# Patient Record
Sex: FEMALE | Race: WHITE | URBAN - METROPOLITAN AREA
[De-identification: names, ages, dates, MRNs, and addresses within clinical notes are randomized per-mention and may not be internally consistent; named-entity substitution may affect disease eponyms.]

---

## 2017-01-10 ENCOUNTER — OFFICE VISIT (OUTPATIENT)
Dept: OBGYN CLINIC | Age: 52
End: 2017-01-10

## 2017-01-10 NOTE — PROGRESS NOTES
A user error has taken place: encounter opened in error, closed for administrative reasons. Patient came in for colpo but was on her period and told to r/s.

## 2017-01-24 ENCOUNTER — OFFICE VISIT (OUTPATIENT)
Dept: OBGYN CLINIC | Age: 52
End: 2017-01-24

## 2017-01-24 ENCOUNTER — HOSPITAL ENCOUNTER (OUTPATIENT)
Dept: LAB | Age: 52
Discharge: HOME | End: 2017-01-24

## 2017-01-24 VITALS
SYSTOLIC BLOOD PRESSURE: 118 MMHG | HEIGHT: 65 IN | DIASTOLIC BLOOD PRESSURE: 70 MMHG | WEIGHT: 186 LBS | BODY MASS INDEX: 30.99 KG/M2

## 2017-01-24 DIAGNOSIS — R87.610 ASCUS WITH POSITIVE HIGH RISK HPV CERVICAL: Primary | ICD-10-CM

## 2017-01-24 DIAGNOSIS — R87.810 ASCUS WITH POSITIVE HIGH RISK HPV CERVICAL: Primary | ICD-10-CM

## 2017-01-24 NOTE — PROGRESS NOTES
[facility]  OFFICE PROCEDURE PROGRESS NOTE        Chart reviewed for the following:   Sydney CHAO LPN, have reviewed the History, Physical and updated the Allergic reactions for [address] performed immediately prior to start of procedure:   Lalo Vega LPN, have performed the following reviews on Tami Love prior to the start of the procedure:            * Patient was identified by name and date of birth   * Agreement on procedure being performed was verified  * Risks and Benefits explained to the patient  * Procedure site verified and marked as necessary  * Patient was positioned for comfort  * Consent was signed and verified     Time: 9:37 AM        Date of procedure: 2017    Procedure performed by:  Silas Carmichael MD    How tolerated by patient: tolerated the procedure well with no complications    Post Procedural Pain Scale: 2 - Hurts Little Bit    Comments: none      Procedure Note: Colposcopy    Tami Love is a ,  54 y.o. female [facility] whose Patient's last menstrual period was 2016 (approximate). was on 2016. She presents for colposcopy for evaluation of a cervical abnormality with a pap smear abnormality consisting of ASCUS, +HPV 16. After being presented with the risks, benefits and alternatives has she signed a consent for the procedure. She states that she understands the need for the procedure and has no further questions. She was informed that she may experience discomfort. Procedure:  She was positioned in the dorsal lithotomy position and a speculum was inserted into the vagina. Dilute acetic acid was applied to the cervix. The colposcope was used to visualize the cervix. Procedure: The transformation zone was completely visualized. Findings: This colposcopy was satisfactory. Biopsies were taken from the cervix . See accompanying diagram for biopsy sites. Monsels was applied to the cervix. Endocervical currettage: An endocervical curettage was performed. Findings:The procedure was notable for white epithelium on the cervix. Post Procedure Status: The patient tolerated the procedure well with minimal discomfort.          A: ASCUS HPV pos  P: appears LG  LEEP in OR if indicated

## 2017-01-24 NOTE — PATIENT INSTRUCTIONS
Colposcopy: What to Expect at [address] may feel some soreness in your vagina for a day or two if you had a biopsy. Some vaginal bleeding or discharge is normal for up to a week after a biopsy. The discharge may be dark-colored if a solution was put on your cervix. You can use a sanitary pad for the bleeding. It may take a week or two for you to get the test results. This care sheet gives you a general idea about how long it will take for you to recover. But each person recovers at a different pace. Follow the steps below to feel better as quickly as possible. How can you care for yourself at home? Activity  · You can return to work and most daily activities right after the test.  Exercise  · Do not exercise for 1 day after the test.  Medicines  · Your doctor will tell you if and when you can restart your medicines. He or she will also give you instructions about taking any new medicines. · If you take blood thinners, such as warfarin (Coumadin), clopidogrel (Plavix), or aspirin, be sure to talk to your doctor. He or she will tell you if and when to start taking those medicines again. Make sure that you understand exactly what your doctor wants you to do. · Take an over-the-counter pain medicine, such as acetaminophen (Tylenol), ibuprofen (Advil, Motrin), or naproxen (Aleve). Be safe with medicines. Read and follow all instructions on the label. Do not take two or more pain medicines at the same time unless the doctor told you to. Many pain medicines have acetaminophen, which is Tylenol. Too much acetaminophen (Tylenol) can be harmful. Other instructions  · Use a pad if you have some bleeding. · Do not douche, have sexual intercourse, or use tampons for 1 week if you had a biopsy. This will allow time for your cervix to heal.  · You can take a bath or shower anytime after the test.  Follow-up care is a key part of your treatment and safety.  Be sure to make and go to all appointments, and call your doctor if you are having problems. It's also a good idea to know your test results and keep a list of the medicines you take. When should you call for help? Call your doctor now or seek immediate medical care if:  · You have severe vaginal bleeding. This means that you are soaking through your usual pads or tampons each hour for 2 or more hours. · You have pain that does not get better after you take pain medicine. · You have signs of infection, such as:  ¨ Increased pain. ¨ Bad-smelling vaginal discharge. ¨ A fever. Watch closely for any changes in your health, and be sure to contact your doctor if:  · You have questions or concerns. Where can you learn more? Go to http://brady-luc.info/. Enter M523 in the search box to learn more about \"Colposcopy: What to Expect at Home. \"  Current as of: July 26, 2016  Content Version: 11.1  © 1811-0689 [employer]. Care instructions adapted under license by [employer] (which disclaims liability or warranty for this information). If you have questions about a medical condition or this instruction, always ask your healthcare professional. [place] [address] any warranty or liability for your use of this information.

## 2017-02-23 DIAGNOSIS — Z12.31 ENCOUNTER FOR SCREENING MAMMOGRAM FOR BREAST CANCER: ICD-10-CM

## 2017-07-14 ENCOUNTER — TELEPHONE (OUTPATIENT)
Dept: OBGYN CLINIC | Age: 52
End: 2017-07-14

## 2017-07-14 NOTE — TELEPHONE ENCOUNTER
Call received at 622am    54year old patient last seen in the office on 12/20/16 for AE and then colposcopy on 1/24/17. Biopsy was negative. Patient is concerned that her pap was positive with HPV  for almost two years and has not cleared. Patient is wanting to have a repeat pap sooner than a year. This nurse explained about insurance and typical coverage. Patient advised that MD out of office and is ok to wait.     Please advise

## 2017-07-17 ENCOUNTER — TELEPHONE (OUTPATIENT)
Dept: OBGYN CLINIC | Age: 52
End: 2017-07-17

## 2017-07-17 NOTE — TELEPHONE ENCOUNTER
Disc pap screening guidelines. Advised repeat pap at the year. Takes many years to develop cervical cancer.

## 2017-07-17 NOTE — TELEPHONE ENCOUNTER
Call received at 9:50am      54year old patient calling back again . This nurse advised of MD being out of the office and was in the OR and will get to her messages and when she replies that this nurse will call the patient back. Patient verbalized understanding.

## 2017-08-01 ENCOUNTER — TELEPHONE (OUTPATIENT)
Dept: OBGYN CLINIC | Age: 52
End: 2017-08-01

## 2017-08-01 NOTE — TELEPHONE ENCOUNTER
Patient calling wanting to know about sexual side effects from her blood pressure medication. Patient advised that typically blood pressure medication do not cause sexual side effects but it could be specific to the patient and since Dr. Juan Dhillon prescribed the medication hydrochlorothiazide, she would need to speak with her PCP about any side effect and patient verbalized understanding.

## 2017-09-10 ENCOUNTER — TELEPHONE (OUTPATIENT)
Dept: OBGYN CLINIC | Age: 52
End: 2017-09-10

## 2017-09-10 RX ORDER — NITROFURANTOIN 25; 75 MG/1; MG/1
100 CAPSULE ORAL 2 TIMES DAILY
Qty: 14 CAP | Refills: 0 | Status: SHIPPED | OUTPATIENT
Start: 2017-09-10 | End: 2017-11-28 | Stop reason: ALTCHOICE

## 2017-09-25 ENCOUNTER — TELEPHONE (OUTPATIENT)
Dept: OBGYN CLINIC | Age: 52
End: 2017-09-25

## 2017-09-25 NOTE — TELEPHONE ENCOUNTER
Call received at       54year old patient last seen in the office on 12/2016 for AE. Patient reports giving blood the end of August and is concerned about passing HPV on. Patient advised that HPV is transmitted via sexual intercourse. Patient wondering about her partner being tested for sexually transmitted diseases in our office the same time she gets tested. Since partner will need to go to his PCP what are the list of sexually transmitted diseases he should get tested for.       Please advise

## 2017-09-25 NOTE — TELEPHONE ENCOUNTER
Patient advised of MD recommendations and the list of sexually transmitted diseases for her partner to get done at his PCP . Patient verbalized understanding.

## 2017-11-27 ENCOUNTER — TELEPHONE (OUTPATIENT)
Dept: OBGYN CLINIC | Age: 52
End: 2017-11-27

## 2017-11-27 NOTE — TELEPHONE ENCOUNTER
She needs an appt.  Can come now I will be here until 4 or see anyone tomorrow - I am out for meeting

## 2017-11-27 NOTE — TELEPHONE ENCOUNTER
Call received at 3:50PM      Patient calling back to find about the message sent to MD .    This nurse advised patient of MD recommendations and was placed on the schedule for tomorrow at 9:40am with Dr. Randa Kruger. Patient verbalized understanding.

## 2017-11-27 NOTE — TELEPHONE ENCOUNTER
Patient is a 50 yr old female that last called in for telephone treatment for a possible UTI on 9/10/17 and was treated with Nitrofurantoin 100 mg. Patient is calling again today with return of similar symptoms. She has pain with urination, urgency with urination, no obvious blood in urine, no nausea, no vomiting, no stomach pains. Her symptoms just started today. Would you want to bring her in for evaluation or send in Rx to [facility]?

## 2017-11-28 ENCOUNTER — OFFICE VISIT (OUTPATIENT)
Dept: OBGYN CLINIC | Age: 52
End: 2017-11-28

## 2017-11-28 VITALS
SYSTOLIC BLOOD PRESSURE: 146 MMHG | TEMPERATURE: 98 F | HEIGHT: 65 IN | DIASTOLIC BLOOD PRESSURE: 70 MMHG | WEIGHT: 176 LBS | BODY MASS INDEX: 29.32 KG/M2

## 2017-11-28 DIAGNOSIS — R30.0 BURNING WITH URINATION: ICD-10-CM

## 2017-11-28 DIAGNOSIS — R39.15 URINARY URGENCY: Primary | ICD-10-CM

## 2017-11-28 DIAGNOSIS — N90.89 VULVAR LESION: ICD-10-CM

## 2017-11-28 LAB
BILIRUB UR QL STRIP: NORMAL
GLUCOSE UR-MCNC: NORMAL MG/DL
KETONES P FAST UR STRIP-MCNC: NORMAL MG/DL
PH UR STRIP: NORMAL [PH] (ref 4.6–8)
PROT UR QL STRIP: NORMAL
SP GR UR STRIP: NORMAL (ref 1–1.03)
UA UROBILINOGEN AMB POC: NORMAL (ref 0.2–1)
URINALYSIS CLARITY POC: NORMAL
URINALYSIS COLOR POC: NORMAL
URINE BLOOD POC: NORMAL
URINE LEUKOCYTES POC: NORMAL
URINE NITRITES POC: NORMAL

## 2017-11-28 RX ORDER — SULFAMETHOXAZOLE AND TRIMETHOPRIM 800; 160 MG/1; MG/1
1 TABLET ORAL 2 TIMES DAILY
Qty: 6 TAB | Refills: 0 | Status: SHIPPED | OUTPATIENT
Start: 2017-11-28 | End: 2017-12-01

## 2017-11-28 NOTE — PATIENT INSTRUCTIONS

## 2017-11-28 NOTE — PROGRESS NOTES
UTI note    Julia Subramanian is a ,  50 y.o. female [facility] [place] who presents today with had concerns including UTI. Her symptoms started yesterday, controlled since that time since starting the AZO. Discomfort is in the suprapubic area and does not radiate. Symptoms are not alleviated by hydration. Symptoms are exacerbated with activity. Feels like she is \"peeing razor blades\". Better since taking Azo yest afternoon and evening. Patient's other complaints: congestion, cough, headache and sorethroat. Her symptoms are moderate. Patient states she has felt feverish from URI. Patient denies back pain. There is not any concern of sexual abuse. There is not a history of trauma to the genital area. Was tx'd for UTI in Sept.  She does not have a history of pyelonephritis. She has a history of  has a past medical history of Abnormal Pap smear of cervix (2015); Abnormal Pap smear of cervix (2016); mammogram (2016); Hypertension; Hypothyroid; and Psoriasis. with the following surgical history  has a past surgical history that includes tonsillectomy; heent (Left, 13); and colposcopy (2017). .  . She has not been evaluated for her current complaints. Last urinalysis results are:    Urine dipstick shows :  Unable to read dip stick due to AZO. Urine dyed the stick all orange.     Results for orders placed or performed in visit on 17   AMB POC URINALYSIS DIP STICK MANUAL W/O MICRO     Status: None   Result Value Ref Range Status    Color (UA POC) Red  Final     Comment: Due to AZO--unable to read urine dip stick    Clarity (UA POC) Cloudy  Final    Glucose (UA POC)  Negative     Bilirubin (UA POC)  Negative     Ketones (UA POC)  Negative     Specific gravity (UA POC)  1.001 - 1.035     Blood (UA POC)  Negative     pH (UA POC)  4.6 - 8.0     Protein (UA POC)  Negative     Urobilinogen (UA POC)  0.2 - 1     Nitrites (UA POC)  Negative     Leukocyte esterase (UA POC) Negative        Past Medical History:   Diagnosis Date    Abnormal Pap smear of cervix 12/2015 12/2015 Neg pap +HPV; 12/20/16 ASCUS +HPV    Abnormal Pap smear of cervix 12/20/2016    ASCUS ; HPV +    Hx of mammogram 12/20/2016    [facility] w[facility]     Hypertension     Hypothyroid     Psoriasis      Past Surgical History:   Procedure Laterality Date    HX COLPOSCOPY  01/24/2017    Colpo neg path    HX HEENT Left 7/18/13    L labrum removed    HX TONSILLECTOMY       Social History     Occupational History    Not on file. Social History Main Topics    Smoking status: Never Smoker    Smokeless tobacco: Never Used    Alcohol use No    Drug use: No    Sexual activity: Yes     Partners: Male     Birth control/ protection: Condom      Comment: partner w/ vasectomy     Family History   Problem Relation Age of Onset    Heart Disease Mother        No Known Allergies  Prior to Admission medications    Medication Sig Start Date End Date Taking? Authorizing Provider   calcium carbonate (OS-NANCY) 500 mg calcium (1,250 mg) tablet Take  by mouth daily. Yes Historical Provider   ascorbic acid, vitamin C, (VITAMIN C) 500 mg tablet Take  by mouth. Yes Historical Provider   SYNTHROID 137 mcg tablet  9/1/15  Yes Historical Provider   nitrofurantoin, macrocrystal-monohydrate, (MACROBID) 100 mg capsule Take 1 Cap by mouth two (2) times a day.  9/10/17   Rashi Rojas MD   hydrochlorothiazide (MICROZIDE) 12.5 mg capsule  11/20/15   Historical Provider        Review of Systems: History obtained from the patient  Constitutional: negative for weight loss, fever, night sweats  Breast: negative for breast lumps, nipple discharge, galactorrhea  GI: negative for change in bowel habits, abdominal pain, black or bloody stools  : see HPI, negative for vaginal discharge  MSK: negative for back pain, joint pain, muscle pain  Skin: negative for itching, rash, hives  Psych: negative for anxiety, depression, change in mood      Objective:  Visit Vitals    /70 (BP 1 Location: Right arm, BP Patient Position: Sitting)    Temp 98 °F (36.7 °C)    Ht 5' 5\" (1.651 m)    Wt 176 lb (79.8 kg)    LMP 11/05/2017    BMI 29.29 kg/m2       Physical Exam:   PHYSICAL EXAMINATION    Constitutional  · Appearance: well-nourished, well developed, alert, in no acute distress    Gastrointestinal  · Abdominal Examination: abdomen non-tender to palpation, normal bowel sounds, no masses present  · Liver and spleen: no hepatomegaly present, spleen not palpable  · Hernias: no hernias identified    Back  · No CVA or flank tenderness    Genitourinary  · External Genitalia:             · Vagina: normal vaginal vault without central or paravaginal defects, no discharge present, no inflammatory lesions present, no masses present  · Bladder: nontender to palpation  · Urethra: appears normal  · Cervix: normal   · Uterus: normal size, shape and consistency  · Adnexa: no adnexal tenderness present, no adnexal masses present  · Perineum: perineum within normal limits, no evidence of trauma, no rashes or skin lesions present  · Anus: anus within normal limits, no hemorrhoids present  · Inguinal Lymph Nodes: no lymphadenopathy present    Skin  · General Inspection: no rash, no lesions identified    Neurologic/Psychiatric  · Mental Status:  · Orientation: grossly oriented to person, place and time  · Mood and Affect: mood normal, affect appropriate      Results for orders placed or performed in visit on 11/28/17   AMB POC URINALYSIS DIP STICK MANUAL W/O MICRO   Result Value Ref Range    Color (UA POC) Red     Clarity (UA POC) Cloudy     Glucose (UA POC)  Negative    Bilirubin (UA POC)  Negative    Ketones (UA POC)  Negative    Specific gravity (UA POC)  1.001 - 1.035    Blood (UA POC)  Negative    pH (UA POC)  4.6 - 8.0    Protein (UA POC)  Negative    Urobilinogen (UA POC)  0.2 - 1    Nitrites (UA POC)  Negative    Leukocyte esterase (UA POC)  Negative     (unable to accurately read UA, pt took Azo this AM)    Assessment:   UTI  Vulvar lesion    Plan:   Rx: Septra DS BID x3d  Ur cx  Vulvar swab for HSV      Orders Placed This Encounter    CULTURE, URINE    HERPES SIMPLEX VIRUS (HSV) VAUGHN    AMB POC URINALYSIS DIP STICK MANUAL W/O MICRO    trimethoprim-sulfamethoxazole (BACTRIM DS, SEPTRA DS) 160-800 mg per tablet     Sig: Take 1 Tab by mouth two (2) times a day for 3 days.      Dispense:  6 Tab     Refill:  0

## 2017-11-28 NOTE — MR AVS SNAPSHOT
Visit Information Date & Time Provider Department Dept. Phone Encounter #  
 11/28/2017  9:40  [phone] [id number] Your Appointments 12/21/2017  1:00 PM  
ESTABLISHED PATIENT with Josh Goss MD  
[place] ([address]) Appt Note: ae; Annual Exam  
 [address] [facility] [address] [place] [address]  
[facility] [address] [facility] Upcoming Health Maintenance Date Due Influenza Age 5 to Adult 8/1/2017 FOBT Q 1 YEAR AGE 50-75 10/3/2017 PAP AKA CERVICAL CYTOLOGY 12/20/2017 BREAST CANCER SCRN MAMMOGRAM 2/22/2018 Allergies as of 11/28/2017  Review Complete On: 11/28/2017 By: Astrid Marcos No Known Allergies Current Immunizations  Never Reviewed No immunizations on file. Not reviewed this visit You Were Diagnosed With   
  
 Codes Comments Urinary urgency    -  Primary ICD-10-CM: R39.15 ICD-9-CM: 995.47 Burning with urination     ICD-10-CM: R30.0 ICD-9-CM: 856. 1 Vitals BP Temp Height(growth percentile) Weight(growth percentile) LMP BMI  
 146/70 (BP 1 Location: Right arm, BP Patient Position: Sitting) 98 °F (36.7 °C) 5' 5\" (1.651 m) 176 lb (79.8 kg) 11/05/2017 29.29 kg/m2 OB Status Smoking Status Having regular periods Never Smoker Vitals History BMI and BSA Data Body Mass Index Body Surface Area  
 29.29 kg/m 2 1.91 m 2 Preferred Pharmacy Pharmacy Name Phone [phone] [address] [place] [address] [address] [place] [address] [phone] Your Updated Medication List  
  
   
This list is accurate as of: 11/28/17 10:12 AM.  Always use your most recent med list.  
  
  
  
  
 calcium carbonate 500 mg calcium (1,250 mg) tablet Commonly known as:  OS-NANCY Take  by mouth daily. hydroCHLOROthiazide 12.5 mg capsule Commonly known as:  MICROZIDE  
  
 nitrofurantoin (macrocrystal-monohydrate) 100 mg capsule Commonly known as:  MACROBID Take 1 Cap by mouth two (2) times a day. SYNTHROID 137 mcg tablet Generic drug:  levothyroxine VITAMIN C 500 mg tablet Generic drug:  ascorbic acid (vitamin C) Take  by mouth. We Performed the Following AMB POC URINALYSIS DIP STICK MANUAL W/O MICRO [90297 CPT(R)] CULTURE, URINE [id number] CPT(R)] Patient Instructions Urinary Tract Infection in Women: Care Instructions Your Care Instructions A urinary tract infection, or UTI, is a general term for an infection anywhere between the kidneys and the urethra (where urine comes out). Most UTIs are bladder infections. They often cause pain or burning when you urinate. UTIs are caused by bacteria and can be cured with antibiotics. Be sure to complete your treatment so that the infection goes away. Follow-up care is a key part of your treatment and safety. Be sure to make and go to all appointments, and call your doctor if you are having problems. It's also a good idea to know your test results and keep a list of the medicines you take. How can you care for yourself at home? · Take your antibiotics as directed. Do not stop taking them just because you feel better. You need to take the full course of antibiotics. · Drink extra water and other fluids for the next day or two. This may help wash out the bacteria that are causing the infection. (If you have kidney, heart, or liver disease and have to limit fluids, talk with your doctor before you increase your fluid intake.) · Avoid drinks that are carbonated or have caffeine. They can irritate the bladder. · Urinate often. Try to empty your bladder each time. · To relieve pain, take a hot bath or lay a heating pad set on low over your lower belly or genital area. Never go to sleep with a heating pad in place. To prevent UTIs · Drink plenty of water each day. This helps you urinate often, which clears bacteria from your system. (If you have kidney, heart, or liver disease and have to limit fluids, talk with your doctor before you increase your fluid intake.) · Urinate when you need to. · Urinate right after you have sex. · Change sanitary pads often. · Avoid douches, bubble baths, feminine hygiene sprays, and other feminine hygiene products that have deodorants. · After going to the bathroom, wipe from front to back. When should you call for help? Call your doctor now or seek immediate medical care if: 
? · Symptoms such as fever, chills, nausea, or vomiting get worse or appear for the first time. ? · You have new pain in your back just below your rib cage. This is called flank pain. ? · There is new blood or pus in your urine. ? · You have any problems with your antibiotic medicine. ? Watch closely for changes in your health, and be sure to contact your doctor if: 
? · You are not getting better after taking an antibiotic for 2 days. ? · Your symptoms go away but then come back. Where can you learn more? Go to http://brady-luc.info/. Enter C354 in the search box to learn more about \"Urinary Tract Infection in Women: Care Instructions. \" Current as of: May 12, 2017 Content Version: 11.4 © 9482-6462 [employer]. Care instructions adapted under license by [employer] (which disclaims liability or warranty for this information). If you have questions about a medical condition or this instruction, always ask your healthcare professional. [place] [address] any warranty or liability for your use of this information. Introducing hospitals & HEALTH SERVICES! Dear Donnie Dixon: Thank you for requesting a [employer] account. Our records indicate that you already have an active [employer] account. You can access your account anytime at [email or web address] Did you know that you can access your hospital and ER discharge instructions at any time in MemberConnection? You can also review all of your test results from your hospital stay or ER visit. Additional Information If you have questions, please visit the Frequently Asked Questions section of the Member[employer] website at [email or web address] Remember, MemberConnection is NOT to be used for urgent needs. For medical emergencies, dial 911. Now available from your iPhone and Android! Please provide this summary of care documentation to your next provider. Your primary care clinician is listed as Shandra Gayle. If you have any questions after today's visit, please call [phone].

## 2017-11-30 LAB
BACTERIA UR CULT: ABNORMAL
BACTERIA UR CULT: ABNORMAL
HSV1 DNA SPEC QL NAA+PROBE: NEGATIVE
HSV2 DNA SPEC QL NAA+PROBE: NEGATIVE

## 2017-11-30 RX ORDER — FLUCONAZOLE 150 MG/1
150 TABLET ORAL DAILY
Qty: 1 TAB | Refills: 0 | Status: SHIPPED | OUTPATIENT
Start: 2017-11-30 | End: 2017-12-01

## 2017-12-21 ENCOUNTER — OFFICE VISIT (OUTPATIENT)
Dept: OBGYN CLINIC | Age: 52
End: 2017-12-21

## 2017-12-21 VITALS
BODY MASS INDEX: 29.46 KG/M2 | DIASTOLIC BLOOD PRESSURE: 90 MMHG | WEIGHT: 176.8 LBS | SYSTOLIC BLOOD PRESSURE: 132 MMHG | HEIGHT: 65 IN

## 2017-12-21 DIAGNOSIS — Z20.2 POSSIBLE EXPOSURE TO STD: ICD-10-CM

## 2017-12-21 DIAGNOSIS — Z11.51 SPECIAL SCREENING EXAMINATION FOR HUMAN PAPILLOMAVIRUS (HPV): ICD-10-CM

## 2017-12-21 DIAGNOSIS — Z86.19 HISTORY OF HPV INFECTION: ICD-10-CM

## 2017-12-21 DIAGNOSIS — Z01.419 ENCOUNTER FOR GYNECOLOGICAL EXAMINATION WITHOUT ABNORMAL FINDING: Primary | ICD-10-CM

## 2017-12-21 DIAGNOSIS — Z12.31 ENCOUNTER FOR SCREENING MAMMOGRAM FOR BREAST CANCER: ICD-10-CM

## 2017-12-21 NOTE — PROGRESS NOTES
Heather Garcia is a ,  50 y.o. female [facility] whose LMP was on 2017 who presents for her annual checkup. She is having no significant problems. Menstrual status:    Her periods are moderate in flow. She is using three to five pads or tampons per day, usually regular every 26-30 days. She denies dysmenorrhea. She reports no premenstrual symptoms. The patient is not using HRT. Contraception:    The current method of family planning is vasectomy. Sexual history:    She  reports that she currently engages in sexual activity and has had male partners. She reports using the following method of birth control/protection: Condom. Medical conditions:    Since her last annual GYN exam about 2016 ago, she has had the following changes in her health history: none. Pap and Mammogram History:    Her most recent Pap smear was abnormal, ASCUS/+HPV obtained 2016 year(s) ago. Colposcopy 2017, negative. The patient has her mammogram at [address] [place] [address]. Last mammo 2017, wnl. Order placed today for 2018. .    Breast Cancer History/Substance Abuse:    She has no family history of breast cancer. Osteoporosis History:    Family history does not include a first or second degree relative with osteopenia or osteoporosis. A bone density scan was not obtained. She is currently not taking calcium and vit D.       Past Medical History:   Diagnosis Date    Abnormal Pap smear of cervix 2015 Neg pap +HPV; 16 ASCUS +HPV    Abnormal Pap smear of cervix 2016    ASCUS ; HPV +    Hypertension     Hypothyroid     Psoriasis      Past Surgical History:   Procedure Laterality Date    HX COLPOSCOPY  2017    Colpo neg path    HX HEENT Left 13    L labrum removed    HX TONSILLECTOMY       Current Outpatient Prescriptions   Medication Sig Dispense Refill    calcium carbonate (OS-NANCY) 500 mg calcium (1,250 mg) tablet Take  by mouth daily.  ascorbic acid, vitamin C, (VITAMIN C) 500 mg tablet Take  by mouth.  hydrochlorothiazide (MICROZIDE) 12.5 mg capsule       SYNTHROID 137 mcg tablet        Allergies: Review of patient's allergies indicates no known allergies. Social History     Social History    Marital status: SINGLE     Spouse name: N/A    Number of children: N/A    Years of education: N/A     Occupational History    Not on file. Social History Main Topics    Smoking status: Never Smoker    Smokeless tobacco: Never Used    Alcohol use No    Drug use: No    Sexual activity: Yes     Partners: Male     Birth control/ protection: Condom      Comment: partner w/ vasectomy     Other Topics Concern    Not on file     Social History Narrative     Tobacco History:  reports that she has never smoked. She has never used smokeless tobacco.  Alcohol Abuse:  reports that she does not drink alcohol. Drug Abuse:  reports that she does not use illicit drugs.   Patient Active Problem List   Diagnosis Code    Uterine leiomyoma D25.9    Cervical high risk HPV (human papillomavirus) test positive R87.810         Review of Systems - History obtained from the patient  Constitutional: negative for weight loss, fever, night sweats  HEENT: negative for hearing loss, earache, congestion, snoring, sorethroat  CV: negative for chest pain, palpitations, edema  Resp: negative for cough, shortness of breath, wheezing  GI: negative for change in bowel habits, abdominal pain, black or bloody stools  : negative for frequency, dysuria, hematuria, vaginal discharge  MSK: negative for back pain, joint pain, muscle pain  Breast: negative for breast lumps, nipple discharge, galactorrhea  Skin :negative for itching, rash, hives  Neuro: negative for dizziness, headache, confusion, weakness  Psych: negative for anxiety, depression, change in mood  Heme/lymph: negative for bleeding, bruising, pallor    Physical Exam    Visit Vitals    /90  Ht 5' 5\" (1.651 m)    Wt 176 lb 12.8 oz (80.2 kg)    LMP 12/14/2017 (Approximate)    BMI 29.42 kg/m2     Constitutional  · Appearance: well-nourished, well developed, alert, in no acute distress    HENT  · Head and Face: appears normal    Neck  · Inspection/Palpation: normal appearance, no masses or tenderness  · Lymph Nodes: no lymphadenopathy present  · Thyroid: gland size normal, nontender, no nodules or masses present on palpation    Chest  · Respiratory Effort: breathing normal  · Auscultation: normal breath sounds    Cardiovascular  · Heart:  · Auscultation: regular rate and rhythm without murmur    Breasts  · Inspection of Breasts: breasts symmetrical, no skin changes, no discharge present, nipple appearance normal, no skin retraction present  · Palpation of Breasts and Axillae: no masses present on palpation, no breast tenderness  · Axillary Lymph Nodes: no lymphadenopathy present    Gastrointestinal  · Abdominal Examination: abdomen non-tender to palpation, normal bowel sounds, no masses present  · Liver and spleen: no hepatomegaly present, spleen not palpable  · Hernias: no hernias identified    Skin  · General Inspection: no rash, no lesions identified    Neurologic/Psychiatric  · Mental Status:  · Orientation: grossly oriented to person, place and time  · Mood and Affect: mood normal, affect appropriate    Genitourinary  · External Genitalia: normal appearance for age, no discharge present, no tenderness present, no inflammatory lesions present, no masses present, no atrophy present  · Vagina: normal vaginal vault without central or paravaginal defects, no discharge present, no inflammatory lesions present, no masses present  · Bladder: non-tender to palpation  · Urethra: appears normal  · Cervix: normal   · Uterus: normal size, shape and consistency  · Adnexa: no adnexal tenderness present, no adnexal masses present  · Perineum: perineum within normal limits, no evidence of trauma, no rashes or skin lesions present  · Anus: anus within normal limits, no hemorrhoids present  · Inguinal Lymph Nodes: no lymphadenopathy present    Assessment:  Routine gynecologic examination  Her current medical status is satisfactory with no evidence of significant gynecologic issues.   Hx of abnl pap, HPV pos  Plan:  Counseled re: diet, exercise, healthy lifestyle  Return for yearly wellness visits  Rec annual mammogram  Pap/HPV  Desires full STD testing

## 2017-12-21 NOTE — PATIENT INSTRUCTIONS
Breast Self-Exam: Care Instructions  Your Care Instructions    A breast self-exam is when you check your breasts for lumps or changes. This regular exam helps you learn how your breasts normally look and feel. Most breast problems or changes are not because of cancer. Breast self-exam is not a substitute for a mammogram. Having regular breast exams by your doctor and regular mammograms improve your chances of finding any problems with your breasts. Some women set a time each month to do a step-by-step breast self-exam. Other women like a less formal system. They might look at their breasts as they brush their teeth, or feel their breasts once in a while in the shower. If you notice a change in your breast, tell your doctor. Follow-up care is a key part of your treatment and safety. Be sure to make and go to all appointments, and call your doctor if you are having problems. It's also a good idea to know your test results and keep a list of the medicines you take. How do you do a breast self-exam?  · The best time to examine your breasts is usually one week after your menstrual period begins. Your breasts should not be tender then. If you do not have periods, you might do your exam on a day of the month that is easy to remember. · To examine your breasts:  ¨ Remove all your clothes above the waist and lie down. When you are lying down, your breast tissue spreads evenly over your chest wall, which makes it easier to feel all your breast tissue. ¨ Use the pads-not the fingertips-of the 3 middle fingers of your left hand to check your right breast. Move your fingers slowly in small coin-sized circles that overlap. ¨ Use three levels of pressure to feel of all your breast tissue. Use light pressure to feel the tissue close to the skin surface. Use medium pressure to feel a little deeper. Use firm pressure to feel your tissue close to your breastbone and ribs.  Use each pressure level to feel your breast tissue before moving on to the next spot. ¨ Check your entire breast, moving up and down as if following a strip from the collarbone to the bra line, and from the armpit to the ribs. Repeat until you have covered the entire breast.  ¨ Repeat this procedure for your left breast, using the pads of the 3 middle fingers of your right hand. · To examine your breasts while in the shower:  ¨ Place one arm over your head and lightly soap your breast on that side. ¨ Using the pads of your fingers, gently move your hand over your breast (in the strip pattern described above), feeling carefully for any lumps or changes. ¨ Repeat for the other breast.  · Have your doctor inspect anything you notice to see if you need further testing. Where can you learn more? Go to http://brady-luc.info/. Enter P148 in the search box to learn more about \"Breast Self-Exam: Care Instructions. \"  Current as of: May 12, 2017  Content Version: 11.4  © 6208-9993 Healthwise, Incorporated. Care instructions adapted under license by [employer] (which disclaims liability or warranty for this information). If you have questions about a medical condition or this instruction, always ask your healthcare professional. [place] [address] any warranty or liability for your use of this information.

## 2017-12-22 LAB
COMMENT, 144067: NORMAL
HBV SURFACE AG SERPL QL IA: NEGATIVE
HCV AB S/CO SERPL IA: <0.1 S/CO RATIO (ref 0–0.9)
HIV 1+2 AB+HIV1 P24 AG SERPL QL IA: NON REACTIVE
HSV2 IGG SER IA-ACNC: <0.91 INDEX (ref 0–0.9)
RPR SER QL: NON REACTIVE

## 2017-12-24 LAB
C TRACH RRNA SPEC QL NAA+PROBE: NEGATIVE
N GONORRHOEA RRNA SPEC QL NAA+PROBE: NEGATIVE
T VAGINALIS RRNA SPEC QL NAA+PROBE: NEGATIVE

## 2017-12-29 ENCOUNTER — TELEPHONE (OUTPATIENT)
Dept: OBGYN CLINIC | Age: 52
End: 2017-12-29

## 2017-12-29 NOTE — TELEPHONE ENCOUNTER
Patient calling for her pap smear results. Patient advised that the results have not yet come back and should be received any day now. Patient advised that she will be notified when the results are back and have been reviewed. Patient verbalized understanding.

## 2017-12-30 LAB
CYTOLOGIST CVX/VAG CYTO: ABNORMAL
CYTOLOGY CVX/VAG DOC THIN PREP: ABNORMAL
CYTOLOGY HISTORY:: ABNORMAL
DX ICD CODE: ABNORMAL
HPV I/H RISK 1 DNA CVX QL PROBE+SIG AMP: POSITIVE
HPV16 DNA CVX QL PROBE+SIG AMP: NEGATIVE
HPV18 DNA CVX QL PROBE+SIG AMP: NEGATIVE
Lab: ABNORMAL
OTHER STN SPEC: ABNORMAL
PATH REPORT.FINAL DX SPEC: ABNORMAL
STAT OF ADQ CVX/VAG CYTO-IMP: ABNORMAL

## 2018-01-02 NOTE — PROGRESS NOTES
Patient aware of results and MD recommendations by phone. Patient explained results and colpo procedure in detail. Patient scheduled colpo for 01/18/18. Aware to take 600mg of motrin 1 hour or 2 extra strength tylenol if she cannot take motrin. Patient verbalized understanding.

## 2018-01-02 NOTE — PROGRESS NOTES
Left message to discuss lab results and Dr's recommendations.  Added to chart, updated pmh, tickled x1yr

## 2018-01-17 ENCOUNTER — TELEPHONE (OUTPATIENT)
Dept: OBGYN CLINIC | Age: 53
End: 2018-01-17

## 2018-01-17 NOTE — TELEPHONE ENCOUNTER
Call received at 1:59PM      50year old patient last seen in the office 12/21/17. Patient is calling to say her cycle started last Friday and she is still spotting currently. Patient advised to call in the morning to reschedule is she is still spotting and if the spotting has stopped she can come to her appointment.     Patient verbalized understanding

## 2018-01-18 ENCOUNTER — OFFICE VISIT (OUTPATIENT)
Dept: OBGYN CLINIC | Age: 53
End: 2018-01-18

## 2018-01-18 VITALS
DIASTOLIC BLOOD PRESSURE: 90 MMHG | SYSTOLIC BLOOD PRESSURE: 140 MMHG | WEIGHT: 175 LBS | HEIGHT: 65 IN | BODY MASS INDEX: 29.16 KG/M2

## 2018-01-18 DIAGNOSIS — R87.618 PAP SMEAR ABNORMALITY OF CERVIX/HUMAN PAPILLOMAVIRUS (HPV) POSITIVE: Primary | ICD-10-CM

## 2018-01-18 NOTE — PATIENT INSTRUCTIONS
Colposcopy: What to Expect at [address] may feel some soreness in your vagina for a day or two if you had a biopsy. Some vaginal bleeding or discharge is normal for up to a week after a biopsy. The discharge may be dark-colored if a solution was put on your cervix. You can use a sanitary pad for the bleeding. It may take a week or two for you to get the test results. This care sheet gives you a general idea about how long it will take for you to recover. But each person recovers at a different pace. Follow the steps below to feel better as quickly as possible. How can you care for yourself at home? Activity  ? · You can return to work and most daily activities right after the test.   Exercise  ? · Do not exercise for 1 day after the test.   Medicines  ? · Your doctor will tell you if and when you can restart your medicines. He or she will also give you instructions about taking any new medicines. ? · If you take blood thinners, such as warfarin (Coumadin), clopidogrel (Plavix), or aspirin, be sure to talk to your doctor. He or she will tell you if and when to start taking those medicines again. Make sure that you understand exactly what your doctor wants you to do. ? · Take an over-the-counter pain medicine, such as acetaminophen (Tylenol), ibuprofen (Advil, Motrin), or naproxen (Aleve). Be safe with medicines. Read and follow all instructions on the label. Do not take two or more pain medicines at the same time unless the doctor told you to. Many pain medicines have acetaminophen, which is Tylenol. Too much acetaminophen (Tylenol) can be harmful. Other instructions  ? · Use a pad if you have some bleeding. ? · Do not douche, have sexual intercourse, or use tampons for 1 week if you had a biopsy. This will allow time for your cervix to heal.   ? · You can take a bath or shower anytime after the test.   Follow-up care is a key part of your treatment and safety.  Be sure to make and go to all appointments, and call your doctor if you are having problems. It's also a good idea to know your test results and keep a list of the medicines you take. When should you call for help? Call your doctor now or seek immediate medical care if:  ? · You have severe vaginal bleeding. This means that you are soaking through your usual pads or tampons each hour for 2 or more hours. ? · You have pain that does not get better after you take pain medicine. ? · You have signs of infection, such as:  ¨ Increased pain. ¨ Bad-smelling vaginal discharge. ¨ A fever. ? Watch closely for any changes in your health, and be sure to contact your doctor if:  ? · You have questions or concerns. Where can you learn more? Go to http://brady-luc.info/. Enter M523 in the search box to learn more about \"Colposcopy: What to Expect at Home. \"  Current as of: May 12, 2017  Content Version: 11.4  © 1103-5146 Healthwise, Incorporated. Care instructions adapted under license by [employer] (which disclaims liability or warranty for this information). If you have questions about a medical condition or this instruction, always ask your healthcare professional. [address] any warranty or liability for your use of this information.

## 2018-01-18 NOTE — PROGRESS NOTES
[facility]  OFFICE PROCEDURE PROGRESS NOTE        Chart reviewed for the following:   Astrid CHAO, have reviewed the History, Physical and updated the Allergic reactions for [address] performed immediately prior to start of procedure:   Astrid CHAO, have performed the following reviews on Jesus Alberto Sheikh prior to the start of the procedure:            * Patient was identified by name and date of birth   * Agreement on procedure being performed was verified  * Risks and Benefits explained to the patient  * Procedure site verified and marked as necessary  * Patient was positioned for comfort  * Consent was signed and verified     Time: 1:29pm      Date of procedure: 2018    Procedure performed by:  Ricci Zafar MD    Patient assisted by: spouse    How tolerated by patient: tolerated the procedure well with no complications    Post Procedural Pain Scale: 2 - Hurts Little Bit    Comments: none    Procedure Note: Colposcopy    Jesus Alberto Sheikh is a ,  50 y.o. female [facility] whose Patient's last menstrual period was 01/10/2018 (approximate). She presents for colposcopy for evaluation of a cervical abnormality with a pap smear abnormality consisting of neg pap +HPV; Colpo last year was normal. After being presented with the risks, benefits and alternatives has she signed a consent for the procedure. She states that she understands the need for the procedure and has no further questions. She was informed that she may experience discomfort. Procedure:  She was positioned in the dorsal lithotomy position and a speculum was inserted into the vagina. Dilute acetic acid was applied to the cervix. The colposcope was used to visualize the cervix. Procedure: The transformation zone was completely visualized. Findings: This colposcopy was satisfactory. Biopsies were taken from the cervix . See accompanying diagram for biopsy sites.  Monsels was applied to the cervix. Endocervical currettage: An endocervical curettage was performed. Findings:The procedure was notable for white epithelium on the cervix. Post Procedure Status: The patient tolerated the procedure well with minimal discomfort.            A: HPV pos  Appears HG    P: LEEP in OR if indicated due to lesion close to vaginal SW  Pt having hip surgery - okay to postpone until able

## 2018-01-22 LAB
DX ICD CODE: NORMAL
DX ICD CODE: NORMAL
PATH REPORT.FINAL DX SPEC: NORMAL
PATH REPORT.GROSS SPEC: NORMAL
PATH REPORT.RELEVANT HX SPEC: NORMAL
PATH REPORT.RELEVANT HX SPEC: NORMAL
PATH REPORT.SITE OF ORIGIN SPEC: NORMAL
PATHOLOGIST NAME: NORMAL
PAYMENT PROCEDURE: NORMAL

## 2018-01-24 NOTE — PROGRESS NOTES
Patient aware of results and MD recommendations by phone. Patient explained colpo results in detail. Patient aware that she will need a repeat pap smear in 1 year. Patient verbalized understanding.

## 2018-01-29 ENCOUNTER — TELEPHONE (OUTPATIENT)
Dept: OBGYN CLINIC | Age: 53
End: 2018-01-29

## 2018-01-29 NOTE — TELEPHONE ENCOUNTER
Patient states that she had a menstrual cycle on 1/11/18 and went to 1/18/18. She then had a colpo done with Dr. Luis Eduardo Esparza and everything went well. Some coffee ground type bleeding but all in all, but all in all patient feels that she did well. She had no signs of her cycle until she underwent hip surgery on 1/26/18. She started bright red bleeding on 1/29/18. Is this normal for a colpo? Patient and I discussed that it may not be an issue with colpo at all. The body is put under a lot of physical stress during surgery and anesthesia can chemically change the cycle. Discussed that it may take time for the anesthesia to work out of her system and for her cycles to become more regular pattern. Use diary to document. Call prn.

## 2018-02-26 DIAGNOSIS — Z12.31 ENCOUNTER FOR SCREENING MAMMOGRAM FOR BREAST CANCER: ICD-10-CM

## 2018-02-26 DIAGNOSIS — Z01.419 ENCOUNTER FOR GYNECOLOGICAL EXAMINATION WITHOUT ABNORMAL FINDING: ICD-10-CM

## 2018-12-26 ENCOUNTER — OFFICE VISIT (OUTPATIENT)
Dept: OBGYN CLINIC | Age: 53
End: 2018-12-26

## 2018-12-26 VITALS
SYSTOLIC BLOOD PRESSURE: 132 MMHG | HEIGHT: 65 IN | BODY MASS INDEX: 31.16 KG/M2 | WEIGHT: 187 LBS | DIASTOLIC BLOOD PRESSURE: 82 MMHG

## 2018-12-26 DIAGNOSIS — N87.0 CIN I (CERVICAL INTRAEPITHELIAL NEOPLASIA I): ICD-10-CM

## 2018-12-26 DIAGNOSIS — Z01.419 WELL FEMALE EXAM WITH ROUTINE GYNECOLOGICAL EXAM: Primary | ICD-10-CM

## 2018-12-26 DIAGNOSIS — Z20.2 POSSIBLE EXPOSURE TO STD: ICD-10-CM

## 2018-12-26 NOTE — PROGRESS NOTES
Vernon Shabazz is a ,  48 y.o. female [facility] whose LMP was on  who presents for her annual checkup. She is having significant dysmenorrhea and she wants std testing today. Menstrual status:    Her periods are moderate, minimal to nonexistent in flow. She is using three to five pads or tampons per day, usually regular and last 26-30 days. She admits dysmenorrhea. She reports no premenstrual symptoms. The patient is not using HRT. Contraception:    The current method of family planning is vasectomy. Sexual history:    She  reports that she currently engages in sexual activity and has had partners who are Male. Medical conditions:    Since her last annual GYN exam about one year ago, she has had the following changes in her health history: none. Pap and Mammogram History:    Her most recent Pap smear was negative HPV POSITIVE obtained 17. She then had a colpo on 18 that showed RACHNA I. The patient had a recent mammogram 18 which was negative for malignancy. Breast Cancer History/Substance Abuse:    She has no family history of breast cancer. Osteoporosis History:    Family history does not include a first or second degree relative with osteopenia or osteoporosis. A bone density scan has not been done. She is currently taking calcium and vit D.       Past Medical History:   Diagnosis Date    Abnormal Pap smear of cervix 2015 Neg pap +HPV; 16 ASCUS +HPV    Abnormal Pap smear of cervix 2016    ASCUS ; HPV +    Abnormal Pap smear of cervix 2017    +HPV    Hypertension     Hypothyroid     Psoriasis      Past Surgical History:   Procedure Laterality Date    HX CARPAL TUNNEL RELEASE  10/2018    HX COLPOSCOPY  2017; 2018    2017 Colpo neg path; 2018 RACHNA 1 ecto    HX HEENT Left 13    L labrum removed    HX TONSILLECTOMY       Current Outpatient Medications   Medication Sig Dispense Refill    calcium carbonate (OS-NANCY) 500 mg calcium (1,250 mg) tablet Take  by mouth daily.  ascorbic acid, vitamin C, (VITAMIN C) 500 mg tablet Take  by mouth.  hydrochlorothiazide (MICROZIDE) 12.5 mg capsule       SYNTHROID 137 mcg tablet        Allergies: Indomethacin   Social History     Socioeconomic History    Marital status: SINGLE     Spouse name: Not on file    Number of children: Not on file    Years of education: Not on file    Highest education level: Not on file   Social Needs    Financial resource strain: Not on file    Food insecurity - worry: Not on file    Food insecurity - inability: Not on file    Transportation needs - medical: Not on file   [employer] needs - non-medical: Not on file   Occupational History    Not on file   Tobacco Use    Smoking status: Never Smoker    Smokeless tobacco: Never Used   Substance and Sexual Activity    Alcohol use: No     Alcohol/week: 0.0 oz    Drug use: No    Sexual activity: Yes     Partners: Male     Comment: partner w/ vasectomy   Other Topics Concern    Not on file   Social History Narrative    Not on file     Tobacco History:  reports that  has never smoked. she has never used smokeless tobacco.  Alcohol Abuse:  reports that she does not drink alcohol. Drug Abuse:  reports that she does not use drugs.   Patient Active Problem List   Diagnosis Code    Uterine leiomyoma D25.9    Cervical high risk HPV (human papillomavirus) test positive R87.810         Review of Systems - History obtained from the patient  Constitutional: negative for weight loss, fever, night sweats  HEENT: negative for hearing loss, earache, congestion, snoring, sorethroat  CV: negative for chest pain, palpitations, edema  Resp: negative for cough, shortness of breath, wheezing  GI: negative for change in bowel habits, abdominal pain, black or bloody stools  : negative for frequency, dysuria, hematuria, vaginal discharge  MSK: negative for back pain, joint pain, muscle pain  Breast: negative for breast lumps, nipple discharge, galactorrhea  Skin :negative for itching, rash, hives  Neuro: negative for dizziness, headache, confusion, weakness  Psych: negative for anxiety, depression, change in mood  Heme/lymph: negative for bleeding, bruising, pallor    Physical Exam    Visit Vitals  /82   Ht 5' 5\" (1.651 m)   Wt 187 lb (84.8 kg)   BMI 31.12 kg/m²     Constitutional  · Appearance: well-nourished, well developed, alert, in no acute distress    HENT  · Head and Face: appears normal    Neck  · Inspection/Palpation: normal appearance, no masses or tenderness  · Lymph Nodes: no lymphadenopathy present  · Thyroid: gland size normal, nontender, no nodules or masses present on palpation    Chest  · Respiratory Effort: breathing normal  · Auscultation: normal breath sounds    Cardiovascular  · Heart:  · Auscultation: regular rate and rhythm without murmur    Breasts  · Inspection of Breasts: breasts symmetrical, no skin changes, no discharge present, nipple appearance normal, no skin retraction present  · Palpation of Breasts and Axillae: no masses present on palpation, no breast tenderness  · Axillary Lymph Nodes: no lymphadenopathy present    Gastrointestinal  · Abdominal Examination: abdomen non-tender to palpation, normal bowel sounds, no masses present  · Liver and spleen: no hepatomegaly present, spleen not palpable  · Hernias: no hernias identified    Skin  · General Inspection: no rash, no lesions identified    Neurologic/Psychiatric  · Mental Status:  · Orientation: grossly oriented to person, place and time  · Mood and Affect: mood normal, affect appropriate    Genitourinary  · External Genitalia: normal appearance for age, no discharge present, no tenderness present, no inflammatory lesions present, no masses present, no atrophy present  · Vagina: normal vaginal vault without central or paravaginal defects, no discharge present, no inflammatory lesions present, no masses present  · Bladder: non-tender to palpation  · Urethra: appears normal  · Cervix: normal   · Uterus: normal size, shape and consistency  · Adnexa: no adnexal tenderness present, no adnexal masses present  · Perineum: perineum within normal limits, no evidence of trauma, no rashes or skin lesions present  · Anus: anus within normal limits, no hemorrhoids present  · Inguinal Lymph Nodes: no lymphadenopathy present    Assessment:  Routine gynecologic examination  RACHNA I  Desires STD testing    Plan:  Counseled re: diet, exercise, healthy lifestyle  Return for yearly wellness visits  Rec annual mammogram  Pap/HPV  Blood STD testing

## 2018-12-26 NOTE — PATIENT INSTRUCTIONS

## 2018-12-29 LAB
C TRACH RRNA CVX QL NAA+PROBE: NEGATIVE
CYTOLOGIST CVX/VAG CYTO: ABNORMAL
CYTOLOGY CVX/VAG DOC THIN PREP: ABNORMAL
CYTOLOGY HISTORY:: ABNORMAL
DX ICD CODE: ABNORMAL
DX ICD CODE: ABNORMAL
HPV I/H RISK 1 DNA CVX QL PROBE+SIG AMP: POSITIVE
Lab: ABNORMAL
N GONORRHOEA RRNA CVX QL NAA+PROBE: NEGATIVE
OTHER STN SPEC: ABNORMAL
PATH REPORT.FINAL DX SPEC: ABNORMAL
PATHOLOGIST CVX/VAG CYTO: ABNORMAL
STAT OF ADQ CVX/VAG CYTO-IMP: ABNORMAL
T VAGINALIS RRNA SPEC QL NAA+PROBE: NEGATIVE

## 2019-01-02 NOTE — PROGRESS NOTES
Added to chart/pmh. Patient notified of lab results and Drs recommendations. Colpo scheduled for 1/16/19.

## 2019-01-02 NOTE — PROGRESS NOTES
Updated pmh and tickler with pap results. Pt notified of pap results through [employer]. Sent another message to schedule colpo.

## 2019-01-16 ENCOUNTER — OFFICE VISIT (OUTPATIENT)
Dept: OBGYN CLINIC | Age: 54
End: 2019-01-16

## 2019-01-16 VITALS — HEIGHT: 65 IN | WEIGHT: 187 LBS | BODY MASS INDEX: 31.16 KG/M2

## 2019-01-16 DIAGNOSIS — R87.612 LGSIL ON PAP SMEAR OF CERVIX: Primary | ICD-10-CM

## 2019-01-16 DIAGNOSIS — R87.810 CERVICAL HIGH RISK HPV (HUMAN PAPILLOMAVIRUS) TEST POSITIVE: ICD-10-CM

## 2019-01-16 NOTE — PROGRESS NOTES
[facility]  OFFICE PROCEDURE PROGRESS NOTE        Chart reviewed for the following:   I April Hemant, have reviewed the History, Physical and updated the Allergic reactions for [address] performed immediately prior to start of procedure:   ZHANNA April Hemant, have performed the following reviews on Isabelle Qureshi prior to the start of the procedure:            * Patient was identified by name and date of birth   * Agreement on procedure being performed was verified  * Risks and Benefits explained to the patient  * Procedure site verified and marked as necessary  * Patient was positioned for comfort  * Consent was signed and verified     Time: 10:30am      Date of procedure: 2019    Procedure performed by:  Dia Reich MD    Patient assisted by: self    How tolerated by patient: tolerated the procedure well with no complications    Post Procedural Pain Scale: 2 - Hurts Little Bit    Procedure Note: Colposcopy    Isabelle Qureshi is a ,  48 y.o. female [facility] whose No LMP recorded. She presents for colposcopy for evaluation of a cervical abnormality with a pap smear abnormality consisting of LSIL+HPV with a h/o RACHNA 1 . After being presented with the risks, benefits and alternatives has she signed a consent for the procedure. She states that she understands the need for the procedure and has no further questions. She was informed that she may experience discomfort. Procedure:  She was positioned in the dorsal lithotomy position and a speculum was inserted into the vagina. Dilute acetic acid was applied to the cervix. The colposcope was used to visualize the cervix. Procedure: The transformation zone was completely visualized. Findings: This colposcopy was satisfactory. Biopsies were taken from the cervix . See accompanying diagram for biopsy sites. Monsels was applied to the cervix. Endocervical currettage: An endocervical curettage was performed. Findings:The procedure was notable for white epithelium on the cervix. Post Procedure Status: The patient tolerated the procedure well with minimal discomfort.        A: LGSIL  P: LEEP in OR if indicated

## 2019-01-16 NOTE — PATIENT INSTRUCTIONS
Colposcopy: What to Expect at [address] may feel some soreness in your vagina for a day or two if you had a biopsy. Some vaginal bleeding or discharge is normal for up to a week after a biopsy. The discharge may be dark-colored if a solution was put on your cervix. You can use a sanitary pad for the bleeding. It may take a week or two for you to get the test results. This care sheet gives you a general idea about how long it will take for you to recover. But each person recovers at a different pace. Follow the steps below to feel better as quickly as possible. How can you care for yourself at home? Activity    · You can return to work and most daily activities right after the test.   Exercise    · Do not exercise for 1 day after the test.   Medicines    · Your doctor will tell you if and when you can restart your medicines. He or she will also give you instructions about taking any new medicines.     · If you take blood thinners, such as warfarin (Coumadin), clopidogrel (Plavix), or aspirin, be sure to talk to your doctor. He or she will tell you if and when to start taking those medicines again. Make sure that you understand exactly what your doctor wants you to do.     · Take an over-the-counter pain medicine, such as acetaminophen (Tylenol), ibuprofen (Advil, Motrin), or naproxen (Aleve). Be safe with medicines. Read and follow all instructions on the label. Do not take two or more pain medicines at the same time unless the doctor told you to. Many pain medicines have acetaminophen, which is Tylenol. Too much acetaminophen (Tylenol) can be harmful. Other instructions    · Use a pad if you have some bleeding.     · Do not douche, have sexual intercourse, or use tampons for 1 week if you had a biopsy. This will allow time for your cervix to heal.     · You can take a bath or shower anytime after the test.   Follow-up care is a key part of your treatment and safety.  Be sure to make and go to all appointments, and call your doctor if you are having problems. It's also a good idea to know your test results and keep a list of the medicines you take. When should you call for help? Call your doctor now or seek immediate medical care if:    · You have severe vaginal bleeding. This means that you are soaking through your usual pads or tampons each hour for 2 or more hours.     · You have pain that does not get better after you take pain medicine.     · You have signs of infection, such as:  ? Increased pain. ? Bad-smelling vaginal discharge. ? A fever.    Watch closely for any changes in your health, and be sure to contact your doctor if:    · You have questions or concerns. Where can you learn more? Go to http://brady-luc.info/. Enter M523 in the search box to learn more about \"Colposcopy: What to Expect at Home. \"  Current as of: March 28, 2018  Content Version: 11.8  © 9172-2480 [employer]. Care instructions adapted under license by [employer] (which disclaims liability or warranty for this information). If you have questions about a medical condition or this instruction, always ask your healthcare professional. [address] any warranty or liability for your use of this information.

## 2019-01-18 LAB
DX ICD CODE: NORMAL
DX ICD CODE: NORMAL
PATH REPORT.FINAL DX SPEC: NORMAL
PATH REPORT.GROSS SPEC: NORMAL
PATH REPORT.RELEVANT HX SPEC: NORMAL
PATH REPORT.SITE OF ORIGIN SPEC: NORMAL
PATHOLOGIST NAME: NORMAL
PAYMENT PROCEDURE: NORMAL

## 2020-01-27 ENCOUNTER — OFFICE VISIT (OUTPATIENT)
Dept: OBGYN CLINIC | Age: 55
End: 2020-01-27

## 2020-01-27 VITALS
BODY MASS INDEX: 30.82 KG/M2 | WEIGHT: 185 LBS | DIASTOLIC BLOOD PRESSURE: 94 MMHG | SYSTOLIC BLOOD PRESSURE: 149 MMHG | HEIGHT: 65 IN | HEART RATE: 88 BPM

## 2020-01-27 DIAGNOSIS — Z11.51 SPECIAL SCREENING EXAMINATION FOR HUMAN PAPILLOMAVIRUS (HPV): ICD-10-CM

## 2020-01-27 DIAGNOSIS — Z01.419 ROUTINE GYNECOLOGICAL EXAMINATION: Primary | ICD-10-CM

## 2020-01-27 DIAGNOSIS — Z12.31 BREAST CANCER SCREENING BY MAMMOGRAM: ICD-10-CM

## 2020-01-27 DIAGNOSIS — N87.0 CIN I (CERVICAL INTRAEPITHELIAL NEOPLASIA I): ICD-10-CM

## 2020-01-27 RX ORDER — UREA 10 %
800 LOTION (ML) TOPICAL DAILY
COMMUNITY
End: 2021-10-25

## 2020-01-27 RX ORDER — MULTIVIT WITH MINERALS/HERBS
1 TABLET ORAL DAILY
COMMUNITY

## 2020-01-27 RX ORDER — LEVOTHYROXINE SODIUM 112 UG/1
TABLET ORAL
COMMUNITY

## 2020-01-27 NOTE — PATIENT INSTRUCTIONS
Well Visit, Women 48 to 72: Care Instructions  Your Care Instructions    Physical exams can help you stay healthy. Your doctor has checked your overall health and may have suggested ways to take good care of yourself. He or she also may have recommended tests. At home, you can help prevent illness with healthy eating, regular exercise, and other steps. Follow-up care is a key part of your treatment and safety. Be sure to make and go to all appointments, and call your doctor if you are having problems. It's also a good idea to know your test results and keep a list of the medicines you take. How can you care for yourself at home? · Reach and stay at a healthy weight. This will lower your risk for many problems, such as obesity, diabetes, heart disease, and high blood pressure. · Get at least 30 minutes of exercise on most days of the week. Walking is a good choice. You also may want to do other activities, such as running, swimming, cycling, or playing tennis or team sports. · Do not smoke. Smoking can make health problems worse. If you need help quitting, talk to your doctor about stop-smoking programs and medicines. These can increase your chances of quitting for good. · Protect your skin from too much sun. When you're outdoors from 10 a.m. to 4 p.m., stay in the shade or cover up with clothing and a hat with a wide brim. Wear sunglasses that block UV rays. Even when it's cloudy, put broad-spectrum sunscreen (SPF 30 or higher) on any exposed skin. · See a dentist one or two times a year for checkups and to have your teeth cleaned. · Wear a seat belt in the car. Follow your doctor's advice about when to have certain tests. These tests can spot problems early. · Cholesterol. Your doctor will tell you how often to have this done based on your age, family history, or other things that can increase your risk for heart attack and stroke. · Blood pressure.  Have your blood pressure checked during a routine doctor visit. Your doctor will tell you how often to check your blood pressure based on your age, your blood pressure results, and other factors. · Mammogram. Ask your doctor how often you should have a mammogram, which is an X-ray of your breasts. A mammogram can spot breast cancer before it can be felt and when it is easiest to treat. · Pap test and pelvic exam. Ask your doctor how often you should have a Pap test. You may not need to have a Pap test as often as you used to. · Vision. Have your eyes checked every year or two or as often as your doctor suggests. Some experts recommend that you have yearly exams for glaucoma and other age-related eye problems starting at age 48. · Hearing. Tell your doctor if you notice any change in your hearing. You can have tests to find out how well you hear. · Diabetes. Ask your doctor whether you should have tests for diabetes. · Colorectal cancer. Your risk for colorectal cancer gets higher as you get older. Some experts say that adults should start regular screening at age 48 and stop at age 76. Others say to start before age 48 or continue after age 76. Talk with your doctor about your risk and when to start and stop screening. · Thyroid disease. Talk to your doctor about whether to have your thyroid checked as part of a regular physical exam. Women have an increased chance of a thyroid problem. · Osteoporosis. You should begin tests for bone density at age 72. If you are younger than 72, ask your doctor whether you have factors that may increase your risk for this disease. You may want to have this test before age 72. · Heart attack and stroke risk. At least every 4 to 6 years, you should have your risk for heart attack and stroke assessed. Your doctor uses factors such as your age, blood pressure, cholesterol, and whether you smoke or have diabetes to show what your risk for a heart attack or stroke is over the next 10 years.   When should you call for help?  Watch closely for changes in your health, and be sure to contact your doctor if you have any problems or symptoms that concern you. Where can you learn more? Go to http://brady-luc.info/. Enter F691 in the search box to learn more about \"Well Visit, Women 50 to 72: Care Instructions. \"  Current as of: December 13, 2018  Content Version: 12.2  © 3345-4517 [employer], [employer]. Care instructions adapted under license by [employer] (which disclaims liability or warranty for this information). If you have questions about a medical condition or this instruction, always ask your healthcare professional. [address] any warranty or liability for your use of this information.

## 2020-01-27 NOTE — PROGRESS NOTES
Reynold Aguirre is a ,  48 y.o. female [facility] whose LMP was on 2020 who presents for her annual checkup. She is having no significant problems. Menstrual status:    Her periods are moderate in flow. She is using three to five pads or tampons per day, usually regular occurring every 26-30 days. She denies dysmenorrhea. She reports no premenstrual symptoms. The patient is not using HRT. Contraception:    The current method of family planning is vasectomy. Sexual history:    She  reports being sexually active and has had partner(s) who are Male. Medical conditions:    Since her last annual GYN exam about 2018 ago, she has had the following changes in her health history: none. Pap and Mammogram History:    Her most recent Pap smear was LGSIL obtained 2018 year(s) ago. Colposcopy done on 2019, RACHNA I    The patient has not had a recent mammogram. Last normal 2018    Breast Cancer History/Substance Abuse:    She has no and a family history of breast cancer. Osteoporosis History:    Family history does not include a first or second degree relative with osteopenia or osteoporosis. A bone density scan was not obtained. She is currently taking calcium and vit D.       Past Medical History:   Diagnosis Date    Abnormal Pap smear of cervix 2015 Neg pap +HPV; 16 ASCUS +HPV    Abnormal Pap smear of cervix 2016    ASCUS ; HPV +    Abnormal Pap smear of cervix 2017    +HPV    Abnormal Pap smear of cervix 2018    LGSIL +HPV    Hypertension     Hypothyroid     Psoriasis      Past Surgical History:   Procedure Laterality Date    HX CARPAL TUNNEL RELEASE  10/2018    HX COLPOSCOPY  2017; 2018; 2019 Colpo neg path;  RACHNA 1 ecto; 2019 RACHNA 1    HX HEENT Left 13    L labrum removed    HX TONSILLECTOMY       Current Outpatient Medications   Medication Sig Dispense Refill    folic acid 800 mcg tablet Take 800 mcg by mouth daily.  b complex vitamins tablet Take 1 Tab by mouth daily.  levothyroxine (SYNTHROID) 112 mcg tablet Take  by mouth Daily (before breakfast).  calcium-cholecalciferol, d3, (CALCIUM 600 + D) 600-125 mg-unit tab Take  by mouth.  ascorbic acid, vitamin C, (VITAMIN C) 500 mg tablet Take  by mouth.  hydrochlorothiazide (MICROZIDE) 12.5 mg capsule       calcium carbonate (OS-NANCY) 500 mg calcium (1,250 mg) tablet Take  by mouth daily.       SYNTHROID 137 mcg tablet        Allergies: Amlodipine and Indomethacin   Social History     Socioeconomic History    Marital status: SINGLE     Spouse name: Not on file    Number of children: Not on file    Years of education: Not on file    Highest education level: Not on file   Occupational History    Not on file   Social Needs    Financial resource strain: Not on file    Food insecurity:     Worry: Not on file     Inability: Not on file    Transportation needs:     Medical: Not on file     Non-medical: Not on file   Tobacco Use    Smoking status: Never Smoker    Smokeless tobacco: Never Used   Substance and Sexual Activity    Alcohol use: No     Alcohol/week: 0.0 standard drinks    Drug use: No    Sexual activity: Yes     Partners: Male     Comment: partner w/ vasectomy   Lifestyle    Physical activity:     Days per week: Not on file     Minutes per session: Not on file    Stress: Not on file   Relationships    Social connections:     Talks on phone: Not on file     Gets together: Not on file     Attends Yazidi service: Not on file     Active member of club or organization: Not on file     Attends meetings of clubs or organizations: Not on file     Relationship status: Not on file    Intimate partner violence:     Fear of current or ex partner: Not on file     Emotionally abused: Not on file     Physically abused: Not on file     Forced sexual activity: Not on file   Other Topics Concern    Not on file   Social History Narrative    Not on file     Tobacco History:  reports that she has never smoked. She has never used smokeless tobacco.  Alcohol Abuse:  reports no history of alcohol use. Drug Abuse:  reports no history of drug use.   Patient Active Problem List   Diagnosis Code    Uterine leiomyoma D25.9    Cervical high risk HPV (human papillomavirus) test positive R87.810         Review of Systems - History obtained from the patient  Constitutional: negative for weight loss, fever, night sweats  HEENT: negative for hearing loss, earache, congestion, snoring, sorethroat  CV: negative for chest pain, palpitations, edema  Resp: negative for cough, shortness of breath, wheezing  GI: negative for change in bowel habits, abdominal pain, black or bloody stools  : negative for frequency, dysuria, hematuria, vaginal discharge  MSK: negative for back pain, joint pain, muscle pain  Breast: negative for breast lumps, nipple discharge, galactorrhea  Skin :negative for itching, rash, hives  Neuro: negative for dizziness, headache, confusion, weakness  Psych: negative for anxiety, depression, change in mood  Heme/lymph: negative for bleeding, bruising, pallor    Physical Exam    Visit Vitals  BP (!) 149/94 (BP 1 Location: Left arm)   Pulse 88   Ht 5' 5\" (1.651 m)   Wt 185 lb (83.9 kg)   LMP 01/13/2020 (Approximate)   BMI 30.79 kg/m²     Constitutional  · Appearance: well-nourished, well developed, alert, in no acute distress    HENT  · Head and Face: appears normal    Neck  · Inspection/Palpation: normal appearance, no masses or tenderness  · Lymph Nodes: no lymphadenopathy present  · Thyroid: gland size normal, nontender, no nodules or masses present on palpation    Chest  · Respiratory Effort: breathing normal  · Auscultation: normal breath sounds    Cardiovascular  · Heart:  · Auscultation: regular rate and rhythm without murmur    Breasts  · Inspection of Breasts: breasts symmetrical, no skin changes, no discharge present, nipple appearance normal, no skin retraction present  · Palpation of Breasts and Axillae: no masses present on palpation, no breast tenderness  · Axillary Lymph Nodes: no lymphadenopathy present    Gastrointestinal  · Abdominal Examination: abdomen non-tender to palpation, normal bowel sounds, no masses present  · Liver and spleen: no hepatomegaly present, spleen not palpable  · Hernias: no hernias identified    Skin  · General Inspection: no rash, no lesions identified    Neurologic/Psychiatric  · Mental Status:  · Orientation: grossly oriented to person, place and time  · Mood and Affect: mood normal, affect appropriate    Genitourinary  · External Genitalia: normal appearance for age, no discharge present, no tenderness present, no inflammatory lesions present, no masses present, no atrophy present  · Vagina: normal vaginal vault without central or paravaginal defects, no discharge present, no inflammatory lesions present, no masses present  · Bladder: non-tender to palpation  · Urethra: appears normal  · Cervix: normal   · Uterus: normal size, shape and consistency  · Adnexa: no adnexal tenderness present, no adnexal masses present  · Perineum: perineum within normal limits, no evidence of trauma, no rashes or skin lesions present  · Anus: anus within normal limits, no hemorrhoids present  · Inguinal Lymph Nodes: no lymphadenopathy present    Assessment:  Routine gynecologic examination  Her current medical status is satisfactory with no evidence of significant gynecologic issues.   RACHNA I    Plan:  Counseled re: diet, exercise, healthy lifestyle  Return for yearly wellness visits  Rec annual mammogram  Pap/HPV

## 2020-01-31 LAB
CYTOLOGIST CVX/VAG CYTO: NORMAL
CYTOLOGY CVX/VAG DOC CYTO: NORMAL
CYTOLOGY CVX/VAG DOC THIN PREP: NORMAL
CYTOLOGY HISTORY:: NORMAL
DX ICD CODE: NORMAL
HPV I/H RISK 1 DNA CVX QL PROBE+SIG AMP: NEGATIVE
Lab: NORMAL
Lab: NORMAL
OTHER STN SPEC: NORMAL
STAT OF ADQ CVX/VAG CYTO-IMP: NORMAL

## 2020-02-27 DIAGNOSIS — Z12.31 BREAST CANCER SCREENING BY MAMMOGRAM: ICD-10-CM

## 2021-02-01 ENCOUNTER — OFFICE VISIT (OUTPATIENT)
Dept: OBGYN CLINIC | Age: 56
End: 2021-02-01
Payer: COMMERCIAL

## 2021-02-01 VITALS
HEIGHT: 65 IN | SYSTOLIC BLOOD PRESSURE: 144 MMHG | WEIGHT: 184 LBS | BODY MASS INDEX: 30.66 KG/M2 | DIASTOLIC BLOOD PRESSURE: 96 MMHG

## 2021-02-01 DIAGNOSIS — Z12.31 BREAST CANCER SCREENING BY MAMMOGRAM: ICD-10-CM

## 2021-02-01 DIAGNOSIS — Z01.419 ENCOUNTER FOR GYNECOLOGICAL EXAMINATION (GENERAL) (ROUTINE) WITHOUT ABNORMAL FINDINGS: Primary | ICD-10-CM

## 2021-02-01 PROCEDURE — 99396 PREV VISIT EST AGE 40-64: CPT | Performed by: OBSTETRICS & GYNECOLOGY

## 2021-02-01 NOTE — PROGRESS NOTES
Vale Genao is a ,  50 y.o. female [facility] whose LMP was on 2021 who presents for her annual checkup. She is having no significant problems. Menstrual status:    Her periods are moderate in flow. She is using three to five pads or tampons per day, usually regular every 26-30 days. She denies dysmenorrhea. She reports no premenstrual symptoms. The patient is not using HRT. Contraception:    The current method of family planning is vasectomy. Sexual history:    She  reports being sexually active and has had partner(s) who are Male. Medical conditions:    Since her last annual GYN exam about 20 ago, she has had the following changes in her health history: none. Pap and Mammogram History:    Her most recent Pap smear was normal/-HPV obtained 2020 year(s) ago. RACHNA I 2019    The patient goes elsewhere for her mammogram, order placed and given to her. Last mammogram was 2020. Breast Cancer History/Substance Abuse:    She has a family history of breast cancer. Osteoporosis History:    Family history does not include a first or second degree relative with osteopenia or osteoporosis. A bone density scan was not obtained. She is currently not taking calcium and vit D.       Past Medical History:   Diagnosis Date    Abnormal Pap smear of cervix 2015 Neg pap +HPV; 16 ASCUS +HPV    Abnormal Pap smear of cervix 2016    ASCUS ; HPV +    Abnormal Pap smear of cervix 2017    +HPV    Abnormal Pap smear of cervix 2018    LGSIL +HPV    Hypertension     Hypothyroid     Psoriasis      Past Surgical History:   Procedure Laterality Date    HX CARPAL TUNNEL RELEASE  10/2018    HX COLPOSCOPY  2017; 2018; 2019    2017 Colpo neg path; 2018 RACHNA 1 ecto; 2019 RACHNA 1    HX HEENT Left 13    L labrum removed    HX TONSILLECTOMY       Current Outpatient Medications   Medication Sig Dispense Refill    folic acid 800 mcg tablet Take 800 mcg by mouth daily.     • b complex vitamins tablet Take 1 Tab by mouth daily.     • levothyroxine (SYNTHROID) 112 mcg tablet Take  by mouth Daily (before breakfast).     • calcium-cholecalciferol, d3, (CALCIUM 600 + D) 600-125 mg-unit tab Take  by mouth.     • ascorbic acid, vitamin C, (VITAMIN C) 500 mg tablet Take  by mouth.     • hydrochlorothiazide (MICROZIDE) 12.5 mg capsule        Allergies: Amlodipine and Indomethacin   Social History     Socioeconomic History   • Marital status: SINGLE     Spouse name: Not on file   • Number of children: Not on file   • Years of education: Not on file   • Highest education level: Not on file   Occupational History   • Not on file   Social Needs   • Financial resource strain: Not on file   • Food insecurity     Worry: Not on file     Inability: Not on file   • Transportation needs     Medical: Not on file     Non-medical: Not on file   Tobacco Use   • Smoking status: Never Smoker   • Smokeless tobacco: Never Used   Substance and Sexual Activity   • Alcohol use: No     Alcohol/week: 0.0 standard drinks   • Drug use: No   • Sexual activity: Yes     Partners: Male     Comment: partner w/ vasectomy   Lifestyle   • Physical activity     Days per week: Not on file     Minutes per session: Not on file   • Stress: Not on file   Relationships   • Social connections     Talks on phone: Not on file     Gets together: Not on file     Attends [employer] service: Not on file     Active member of club or organization: Not on file     Attends meetings of clubs or organizations: Not on file     Relationship status: Not on file   • Intimate partner violence     Fear of current or ex partner: Not on file     Emotionally abused: Not on file     Physically abused: Not on file     Forced sexual activity: Not on file   Other Topics Concern   • Not on file   Social History Narrative   • Not on file     Tobacco History:  reports that she has never smoked. She has never  used smokeless tobacco.  Alcohol Abuse:  reports no history of alcohol use. Drug Abuse:  reports no history of drug use.   Patient Active Problem List   Diagnosis Code    Uterine leiomyoma D25.9    Cervical high risk HPV (human papillomavirus) test positive R87.810         Review of Systems - History obtained from the patient  Constitutional: negative for weight loss, fever, night sweats  HEENT: negative for hearing loss, earache, congestion, snoring, sorethroat  CV: negative for chest pain, palpitations, edema  Resp: negative for cough, shortness of breath, wheezing  GI: negative for change in bowel habits, abdominal pain, black or bloody stools  : negative for frequency, dysuria, hematuria, vaginal discharge  MSK: negative for back pain, joint pain, muscle pain  Breast: negative for breast lumps, nipple discharge, galactorrhea  Skin :negative for itching, rash, hives  Neuro: negative for dizziness, headache, confusion, weakness  Psych: negative for anxiety, depression, change in mood  Heme/lymph: negative for bleeding, bruising, pallor    Physical Exam    Visit Vitals  BP (!) 144/96   Ht 5' 5\" (1.651 m)   Wt 184 lb (83.5 kg)   LMP 01/20/2021 (Approximate)   BMI 30.62 kg/m²     Constitutional  · Appearance: well-nourished, well developed, alert, in no acute distress    HENT  · Head and Face: appears normal    Neck  · Inspection/Palpation: normal appearance, no masses or tenderness  · Lymph Nodes: no lymphadenopathy present  · Thyroid: gland size normal, nontender, no nodules or masses present on palpation    Chest  · Respiratory Effort: breathing normal  · Auscultation: normal breath sounds    Cardiovascular  · Heart:  · Auscultation: regular rate and rhythm without murmur    Breasts  · Inspection of Breasts: breasts symmetrical, no skin changes, no discharge present, nipple appearance normal, no skin retraction present  · Palpation of Breasts and Axillae: no masses present on palpation, no breast tenderness  · Axillary Lymph Nodes: no lymphadenopathy present    Gastrointestinal  · Abdominal Examination: abdomen non-tender to palpation, normal bowel sounds, no masses present  · Liver and spleen: no hepatomegaly present, spleen not palpable  · Hernias: no hernias identified    Skin  · General Inspection: no rash, no lesions identified    Neurologic/Psychiatric  · Mental Status:  · Orientation: grossly oriented to person, place and time  · Mood and Affect: mood normal, affect appropriate    Genitourinary  · External Genitalia: normal appearance for age, no discharge present, no tenderness present, no inflammatory lesions present, no masses present, no atrophy present  · Vagina: normal vaginal vault without central or paravaginal defects, no discharge present, no inflammatory lesions present, no masses present  · Bladder: non-tender to palpation  · Urethra: appears normal  · Cervix: normal   · Uterus: normal size, shape and consistency  · Adnexa: no adnexal tenderness present, no adnexal masses present  · Perineum: perineum within normal limits, no evidence of trauma, no rashes or skin lesions present  · Anus: anus within normal limits, no hemorrhoids present  · Inguinal Lymph Nodes: no lymphadenopathy present    Assessment:  Routine gynecologic examination  Her current medical status is satisfactory with no evidence of significant gynecologic issues.   LGSIL 2019  Plan:  Counseled re: diet, exercise, healthy lifestyle  Return for yearly wellness visits  Rec annual mammogram  Pap 2023 per [facility]

## 2021-03-25 DIAGNOSIS — Z12.31 BREAST CANCER SCREENING BY MAMMOGRAM: ICD-10-CM

## 2021-03-25 PROCEDURE — 77067 SCR MAMMO BI INCL CAD: CPT | Performed by: OBSTETRICS & GYNECOLOGY

## 2021-09-09 ENCOUNTER — TELEPHONE (OUTPATIENT)
Dept: OBGYN CLINIC | Age: 56
End: 2021-09-09

## 2021-09-09 NOTE — TELEPHONE ENCOUNTER
Pt calling with c/o a headache/head pressure x 2.5 weeks. Pt states she thinks she is starting menopause. Pt states she hasn't had a period April and May, then had one June and July, and then no period again in August. Pt also states she was exposed to [employer] at her work, went to be tested and the rapid and PCR was negative. So then pt was seen by her PCP 9/1 and high BP (170/102) so PCP attributed headache to high BP and prescribed losartin and pt was already on HCTZ. Pt states since starting, her BP's have gone down (127/82 @ 0915 today) but headache still is there and getting worse. Pt states the pressure is right behind her eyes and it feels like her eyes are bulging out of her head. Pt was also prescribed fioricet, but with no relief. Pt also states she doesn't think its a migraine but maybe related to starting menopause? Pt also stated the week of 8/23-8/27 she had what she thinks could have been hot flashes that happened every 15 minutes and described it as \"cooking inside\". She denied any sweating or flushing. Pt does have appt with  10/1.      ?OV    Please review

## 2021-10-25 ENCOUNTER — OFFICE VISIT (OUTPATIENT)
Dept: NEUROLOGY | Age: 56
End: 2021-10-25
Payer: COMMERCIAL

## 2021-10-25 VITALS
TEMPERATURE: 97.3 F | BODY MASS INDEX: 30.45 KG/M2 | SYSTOLIC BLOOD PRESSURE: 132 MMHG | OXYGEN SATURATION: 98 % | WEIGHT: 183 LBS | HEART RATE: 86 BPM | DIASTOLIC BLOOD PRESSURE: 84 MMHG

## 2021-10-25 DIAGNOSIS — R51.9 NEW ONSET OF HEADACHES AFTER AGE 50: Primary | ICD-10-CM

## 2021-10-25 DIAGNOSIS — R51.9 NONINTRACTABLE HEADACHE, UNSPECIFIED CHRONICITY PATTERN, UNSPECIFIED HEADACHE TYPE: ICD-10-CM

## 2021-10-25 PROCEDURE — 99204 OFFICE O/P NEW MOD 45 MIN: CPT | Performed by: PSYCHIATRY & NEUROLOGY

## 2021-10-25 NOTE — PROGRESS NOTES
[facility] and [address] at [facility] at [address] [facility], [address] [facility] [address] [facility], [address] [place] [address]  [phone] Office  [phone]           Referring: Sara Mcnair MD  [address] [facility]    Chief Complaint   Patient presents with    Headache     Headaches in the back of the head since August with off an on pain, constant pressure     79year-old lady who presents today for initial neurologic consultation regarding new onset headache. She tells me that she has never been a headache lady. She does have some family members with headache but she has never been a headache lady. This headache started in mid August.  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 3 people despite that came down with Covid. In mid August she started to have a headache in the vertex and occipital region and she had the first thought that she was coming down with Covid. She had a rapid Covid test that was negative and she followed that with a PCR that was also negative. She continued with this persistent headache. Nothing would take it away. Her blood pressure was elevated. She was started on losartan in addition to her HCTZ and that brought her blood pressure down but headache persisted. She had no associated symptoms such as photophobia phonophobia nausea or vomiting. The headache again persisted. She notes that it is something that she can work through and that it does not debilitate her. She does not need to sequester herself. She is able to work but it is there and persistent. She did have an increase in headache yesterday where she felt like her skull was going to open up and split open.   She is in a perimenopausal state at this point where she has 2 months where she will have a cycle in 2 months where she will not. She also ruptured a tendon in her right foot recently and is undergoing some ultrasound therapy and is having some pain with that. Medically she has been doing well except for the elevated blood pressure. She does have some stress with him being short staffed at work but no other inciting factor. Record review finds office visit with Dr. Jose Navarrete from February 1, 2021 where she was coming in for her well woman visit. She was said to have no evidence of significant gynecologic issue. She was counseled about diet exercise healthy lifestyle and was to return for her yearly wellness visit and annual mammogram was recommended. No focal weakness. No change in vision. No loss or alteration in consciousness. No numbness or tingling. Past Medical History:   Diagnosis Date    Abnormal Pap smear of cervix 12/2015 12/2015 Neg pap +HPV; 12/20/16 ASCUS +HPV    Abnormal Pap smear of cervix 12/20/2016    ASCUS ; HPV +    Abnormal Pap smear of cervix 12/2017    +HPV    Abnormal Pap smear of cervix 12/28/2018    LGSIL +HPV    Hypertension     Hypothyroid     Psoriasis        Past Surgical History:   Procedure Laterality Date    HX CARPAL TUNNEL RELEASE  10/2018    HX COLPOSCOPY  01/24/2017; 1/18/2018; 1/16/2019    2017 Colpo neg path; 2018 RACHNA 1 ecto; 2019 RACHNA 1    HX HEENT Left 7/18/13    L labrum removed    HX TONSILLECTOMY         Current Outpatient Medications   Medication Sig Dispense Refill    LOSARTAN PO Take  by mouth.  docosahexaenoic acid/epa (FISH OIL PO) Take  by mouth.  b complex vitamins tablet Take 1 Tab by mouth daily.  levothyroxine (SYNTHROID) 112 mcg tablet Take  by mouth Daily (before breakfast).  calcium-cholecalciferol, d3, (CALCIUM 600 + D) 600-125 mg-unit tab Take  by mouth.  ascorbic acid, vitamin C, (VITAMIN C) 500 mg tablet Take  by mouth.       hydrochlorothiazide (MICROZIDE) 12.5 mg capsule       folic acid 494 mcg tablet Take 800 mcg by mouth daily. (Patient not taking: Reported on 10/25/2021)          Allergies   Allergen Reactions    Amlodipine Swelling    Indomethacin Other (comments)     dizziness       Social History     Tobacco Use    Smoking status: Never Smoker    Smokeless tobacco: Never Used   Substance Use Topics    Alcohol use: No     Alcohol/week: 0.0 standard drinks    Drug use: No       Family History   Problem Relation Age of Onset    Heart Disease Mother        Review of Systems  Pertinent positives and negatives as noted. Examination  Visit Vitals  /84 (BP 1 Location: Left arm, BP Patient Position: Sitting, BP Cuff Size: Adult)   Pulse 86   Temp 97.3 °F (36.3 °C) (Temporal)   Wt 83 kg (183 lb)   SpO2 98%   BMI 30.45 kg/m²     Neurologically, she is awake, alert, and oriented with normal speech and language. Her cognition is normal.    Intact cranial nerves 2-12. No nystagmus. Disk margins are flat bilaterally. She has normal bulk and tone. She has no abnormal movement. She has no pronation or drift. She generates full strength in the upper and lower extremities to direct confrontational testing. Reflexes are symmetrical in the upper and lower extremities bilaterally. No pathologic reflexes are elicited. Finger nose finger and rapid alternating movements are normal.  Steady gait. Impression/Plan  This is a very pleasant 61-year-old lady with no prior history of headache with new onset persistent headache with no inciting factor and certainly we need to exclude ominous etiologies  Sedimentation rate and MRI of the brain  If these are normal then we could pursue headache modulation with either Topamax dosed in a nightly fashion 25 mg escalating to a dose of 100 mg at bedtime over the course of 4 weeks or low-dose Elavil 25 mg nightly  We will await her studies  Sepideh Gonzales MD          This note was created using voice recognition software. Despite editing, there may be syntax errors.

## 2021-10-25 NOTE — PROGRESS NOTES
Chief Complaint   Patient presents with    Headache     Headaches in the back of the head since August with off an on pain, constant pressure     Visit Vitals  /84 (BP 1 Location: Left arm, BP Patient Position: Sitting, BP Cuff Size: Adult)   Pulse 86   Temp 97.3 °F (36.3 °C) (Temporal)   Wt 83 kg (183 lb)   SpO2 98%   BMI 30.45 kg/m²

## 2021-10-26 LAB — ERYTHROCYTE [SEDIMENTATION RATE] IN BLOOD BY WESTERGREN METHOD: 2 MM/HR (ref 0–40)

## 2021-10-28 ENCOUNTER — TELEPHONE (OUTPATIENT)
Dept: NEUROLOGY | Age: 56
End: 2021-10-28

## 2021-10-28 NOTE — TELEPHONE ENCOUNTER
----- Message from Albert Hector sent at 10/28/2021  8:50 AM EDT -----  Regarding: [email or web address].epp\telephone  Contact: [phone]  General Message/Vendor Calls    Caller's first and last name:self      Reason for call:will like someone to let her know what her test result say       Callback required yes/no and why:y      Best contact number(s):[phone]      Details to clarify the request:n\a      Albert Hector

## 2021-11-01 ENCOUNTER — TELEPHONE (OUTPATIENT)
Dept: NEUROLOGY | Age: 56
End: 2021-11-01

## 2021-11-01 NOTE — TELEPHONE ENCOUNTER
Phylicia Dykes w/ [facility] scheduling calling stating patients insurance is requiring patient's response to conservative therapy and/or a peer to peer. Patient has scheduled herself for 11/2 and Phylicia Dykes is wondering if Dr. Verner Platt is katarzyna to do peer to peer.      [employer] phone number: [phone]  reference is member ID: [id number]

## 2021-12-07 NOTE — TELEPHONE ENCOUNTER
S/w pt, notified of insurance denial.  She states she will have to see another physician for migraines as this clinic is not in network with her insurance

## 2023-05-24 RX ORDER — ASCORBIC ACID 500 MG
TABLET ORAL
COMMUNITY

## 2023-05-24 RX ORDER — LEVOTHYROXINE SODIUM 112 UG/1
TABLET ORAL
COMMUNITY

## 2023-05-24 RX ORDER — HYDROCHLOROTHIAZIDE 12.5 MG/1
CAPSULE, GELATIN COATED ORAL
COMMUNITY
Start: 2015-11-20